# Patient Record
Sex: FEMALE | Race: WHITE | Employment: UNEMPLOYED | URBAN - METROPOLITAN AREA
[De-identification: names, ages, dates, MRNs, and addresses within clinical notes are randomized per-mention and may not be internally consistent; named-entity substitution may affect disease eponyms.]

---

## 2018-08-02 ENCOUNTER — HOSPITAL ENCOUNTER (OUTPATIENT)
Dept: LAB | Age: 33
Discharge: HOME | End: 2018-08-02

## 2018-08-02 LAB — SENTARA SPECIMEN COL,SENBCF: NORMAL

## 2018-08-02 PROCEDURE — 99001 SPECIMEN HANDLING PT-LAB: CPT | Performed by: PSYCHIATRY & NEUROLOGY

## 2020-10-01 NOTE — PERIOP NOTES
PAT - SURGICAL PRE-ADMISSION INSTRUCTIONS    NAME:  Pj Zuluaga                                                          TODAY'S DATE:  10/1/2020    SURGERY DATE:  10/7/2020                                  SURGERY ARRIVAL TIME:   0700 tbv    1. Do NOT eat or drink anything, including candy or gum, after MIDNIGHT on 10/6/20 , unless you have specific instructions from your Surgeon or Anesthesia Provider to do so. 2. No smoking on the day of surgery. 3. No alcohol 24 hours prior to the day of surgery. 4. No recreational drugs for one week prior to the day of surgery. 5. Leave all valuables, including money/purse, at home. 6. Remove all jewelry, nail polish, makeup (including mascara); no lotions, powders, deodorant, or perfume/cologne/after shave. 7. Glasses/Contact lenses and Dentures may be worn to the hospital.  They will be removed prior to surgery. 8. Call your doctor if symptoms of a cold or illness develop within 24 ours prior to surgery. 9. AN ADULT MUST DRIVE YOU HOME AFTER OUTPATIENT SURGERY. 10. If you are having an OUTPATIENT procedure, please make arrangements for a responsible adult to be with you for 24 hours after your surgery. 11. If you are admitted to the hospital, you will be assigned to a bed after surgery is complete. Normally a family member will not be able to see you until you are in your assigned bed. 15. Family is encouraged to accompany you to the hospital.  We ask visitors in the treatment area to be limited to ONE person at a time to ensure patient privacy. EXCEPTIONS WILL BE MADE AS NEEDED. 15. Children under 12 are discouraged from entering the treatment area and need to be supervised by an adult when in the waiting room. Special Instructions:    Bring list of CURRENT medications . , Take these medications the morning of surgery with a sip of water:  morning anxiety / psych meds    Patient Prep:    shower with anti-bacterial soap    These surgical instructions were reviewed with Son Taylor (pt's caregiver) during the PAT phone call. Directions: On the morning of surgery, please go to the main entrance. Sign in at the Registration Desk.     If you have any questions and/or concerns, please do not hesitate to call:  (Prior to the day of surgery)  hospitals unit:  [phone]  (Day of surgery)  [facility] unit:  [phone]

## 2020-10-02 ENCOUNTER — HOSPITAL ENCOUNTER (OUTPATIENT)
Dept: PREADMISSION TESTING | Age: 35
Discharge: HOME | End: 2020-10-02
Payer: MEDICAID

## 2020-10-02 ENCOUNTER — TRANSCRIBE ORDER (OUTPATIENT)
Dept: REGISTRATION | Age: 35
End: 2020-10-02

## 2020-10-02 DIAGNOSIS — Z01.812 BLOOD TESTS PRIOR TO TREATMENT OR PROCEDURE: ICD-10-CM

## 2020-10-02 DIAGNOSIS — Z01.812 BLOOD TESTS PRIOR TO TREATMENT OR PROCEDURE: Primary | ICD-10-CM

## 2020-10-02 DIAGNOSIS — Z20.820 CONTACT WITH OR EXPOSURE TO VARICELLA: ICD-10-CM

## 2020-10-02 PROCEDURE — 87635 SARS-COV-2 COVID-19 AMP PRB: CPT

## 2020-10-03 LAB — SARS-COV-2, COV2NT: NOT DETECTED

## 2020-10-06 ENCOUNTER — ANESTHESIA EVENT (OUTPATIENT)
Dept: SURGERY | Age: 35
End: 2020-10-06
Payer: MEDICAID

## 2020-10-07 ENCOUNTER — HOSPITAL ENCOUNTER (OUTPATIENT)
Age: 35
Setting detail: OUTPATIENT SURGERY
Discharge: HOME | End: 2020-10-07
Attending: DENTIST | Admitting: DENTIST
Payer: MEDICAID

## 2020-10-07 ENCOUNTER — ANESTHESIA (OUTPATIENT)
Dept: SURGERY | Age: 35
End: 2020-10-07
Payer: MEDICAID

## 2020-10-07 VITALS
RESPIRATION RATE: 16 BRPM | WEIGHT: 183 LBS | BODY MASS INDEX: 33.68 KG/M2 | OXYGEN SATURATION: 100 % | HEART RATE: 90 BPM | SYSTOLIC BLOOD PRESSURE: 130 MMHG | HEIGHT: 62 IN | TEMPERATURE: 97.5 F | DIASTOLIC BLOOD PRESSURE: 78 MMHG

## 2020-10-07 DIAGNOSIS — K02.9 CARIES: Primary | ICD-10-CM

## 2020-10-07 LAB — HCG UR QL: NEGATIVE

## 2020-10-07 PROCEDURE — 77030008683 HC TU ET CUF COVD -A: Performed by: ANESTHESIOLOGY

## 2020-10-07 PROCEDURE — 00170 ANES INTRAORAL PX NOS: CPT | Performed by: ANESTHESIOLOGY

## 2020-10-07 PROCEDURE — 74011000258 HC RX REV CODE- 258: Performed by: DENTIST

## 2020-10-07 PROCEDURE — 74011250636 HC RX REV CODE- 250/636: Performed by: DENTIST

## 2020-10-07 PROCEDURE — 74011250637 HC RX REV CODE- 250/637: Performed by: NURSE ANESTHETIST, CERTIFIED REGISTERED

## 2020-10-07 PROCEDURE — 2709999900 HC NON-CHARGEABLE SUPPLY: Performed by: DENTIST

## 2020-10-07 PROCEDURE — 74011250636 HC RX REV CODE- 250/636: Performed by: NURSE ANESTHETIST, CERTIFIED REGISTERED

## 2020-10-07 PROCEDURE — 76210000021 HC REC RM PH II 0.5 TO 1 HR: Performed by: DENTIST

## 2020-10-07 PROCEDURE — 74011000250 HC RX REV CODE- 250: Performed by: DENTIST

## 2020-10-07 PROCEDURE — 77030014006 HC SPNG HEMSTAT J&J -A: Performed by: DENTIST

## 2020-10-07 PROCEDURE — 77030002888 HC SUT CHRMC J&J -A: Performed by: DENTIST

## 2020-10-07 PROCEDURE — 77030032490 HC SLV COMPR SCD KNE COVD -B: Performed by: DENTIST

## 2020-10-07 PROCEDURE — 76210000063 HC OR PH I REC FIRST 0.5 HR: Performed by: DENTIST

## 2020-10-07 PROCEDURE — 76060000032 HC ANESTHESIA 0.5 TO 1 HR: Performed by: DENTIST

## 2020-10-07 PROCEDURE — 00170 ANES INTRAORAL PX NOS: CPT | Performed by: NURSE ANESTHETIST, CERTIFIED REGISTERED

## 2020-10-07 PROCEDURE — 76010000138 HC OR TIME 0.5 TO 1 HR: Performed by: DENTIST

## 2020-10-07 PROCEDURE — 88305 TISSUE EXAM BY PATHOLOGIST: CPT

## 2020-10-07 PROCEDURE — 81025 URINE PREGNANCY TEST: CPT

## 2020-10-07 PROCEDURE — 77030028681 HC DRSG NSL ABSRB NASOPORE STRY -C: Performed by: DENTIST

## 2020-10-07 RX ORDER — SODIUM CHLORIDE, SODIUM LACTATE, POTASSIUM CHLORIDE, CALCIUM CHLORIDE 600; 310; 30; 20 MG/100ML; MG/100ML; MG/100ML; MG/100ML
25 INJECTION, SOLUTION INTRAVENOUS CONTINUOUS
Status: DISCONTINUED | OUTPATIENT
Start: 2020-10-07 | End: 2020-10-07 | Stop reason: HOSPADM

## 2020-10-07 RX ORDER — FENTANYL CITRATE 50 UG/ML
INJECTION, SOLUTION INTRAMUSCULAR; INTRAVENOUS AS NEEDED
Status: DISCONTINUED | OUTPATIENT
Start: 2020-10-07 | End: 2020-10-07 | Stop reason: HOSPADM

## 2020-10-07 RX ORDER — TOPIRAMATE 100 MG/1
TABLET, FILM COATED ORAL 2 TIMES DAILY
COMMUNITY

## 2020-10-07 RX ORDER — AMOXICILLIN 500 MG/1
500 CAPSULE ORAL 3 TIMES DAILY
Qty: 21 CAP | Refills: 0 | Status: SHIPPED | OUTPATIENT
Start: 2020-10-07 | End: 2020-10-14

## 2020-10-07 RX ORDER — FENTANYL CITRATE 50 UG/ML
50 INJECTION, SOLUTION INTRAMUSCULAR; INTRAVENOUS
Status: DISCONTINUED | OUTPATIENT
Start: 2020-10-07 | End: 2020-10-07 | Stop reason: HOSPADM

## 2020-10-07 RX ORDER — CHLORHEXIDINE GLUCONATE 1.2 MG/ML
15 RINSE ORAL EVERY 12 HOURS
Qty: 960 ML | Refills: 0 | Status: SHIPPED | OUTPATIENT
Start: 2020-10-07 | End: 2020-10-17

## 2020-10-07 RX ORDER — SUCCINYLCHOLINE CHLORIDE 100 MG/5ML
SYRINGE (ML) INTRAVENOUS AS NEEDED
Status: DISCONTINUED | OUTPATIENT
Start: 2020-10-07 | End: 2020-10-07 | Stop reason: HOSPADM

## 2020-10-07 RX ORDER — OXYCODONE AND ACETAMINOPHEN 5; 325 MG/1; MG/1
1 TABLET ORAL AS NEEDED
Status: DISCONTINUED | OUTPATIENT
Start: 2020-10-07 | End: 2020-10-07 | Stop reason: HOSPADM

## 2020-10-07 RX ORDER — MEDROXYPROGESTERONE ACETATE 150 MG/ML
150 INJECTION, SUSPENSION INTRAMUSCULAR ONCE
COMMUNITY

## 2020-10-07 RX ORDER — PROPOFOL 10 MG/ML
INJECTION, EMULSION INTRAVENOUS AS NEEDED
Status: DISCONTINUED | OUTPATIENT
Start: 2020-10-07 | End: 2020-10-07 | Stop reason: HOSPADM

## 2020-10-07 RX ORDER — MORPHINE SULFATE 2 MG/ML
2 INJECTION, SOLUTION INTRAMUSCULAR; INTRAVENOUS
Status: DISCONTINUED | OUTPATIENT
Start: 2020-10-07 | End: 2020-10-07 | Stop reason: HOSPADM

## 2020-10-07 RX ORDER — IBUPROFEN 800 MG/1
800 TABLET ORAL
Qty: 20 TAB | Refills: 1 | Status: SHIPPED | OUTPATIENT
Start: 2020-10-07

## 2020-10-07 RX ORDER — HYDROCODONE BITARTRATE AND ACETAMINOPHEN 7.5; 325 MG/1; MG/1
1 TABLET ORAL
Qty: 12 TAB | Refills: 0 | Status: SHIPPED | OUTPATIENT
Start: 2020-10-07 | End: 2020-10-10

## 2020-10-07 RX ORDER — LORAZEPAM 1 MG/1
TABLET ORAL
COMMUNITY

## 2020-10-07 RX ORDER — FAMOTIDINE 20 MG/1
20 TABLET, FILM COATED ORAL ONCE
Status: COMPLETED | OUTPATIENT
Start: 2020-10-07 | End: 2020-10-07

## 2020-10-07 RX ORDER — BENZONATATE 200 MG/1
200 CAPSULE ORAL
COMMUNITY

## 2020-10-07 RX ORDER — ONDANSETRON 2 MG/ML
INJECTION INTRAMUSCULAR; INTRAVENOUS AS NEEDED
Status: DISCONTINUED | OUTPATIENT
Start: 2020-10-07 | End: 2020-10-07 | Stop reason: HOSPADM

## 2020-10-07 RX ORDER — LAMOTRIGINE 100 MG/1
TABLET ORAL DAILY
COMMUNITY

## 2020-10-07 RX ORDER — LIDOCAINE HYDROCHLORIDE AND EPINEPHRINE 20; 5 MG/ML; UG/ML
INJECTION, SOLUTION EPIDURAL; INFILTRATION; INTRACAUDAL; PERINEURAL AS NEEDED
Status: DISCONTINUED | OUTPATIENT
Start: 2020-10-07 | End: 2020-10-07 | Stop reason: HOSPADM

## 2020-10-07 RX ORDER — MIDAZOLAM HYDROCHLORIDE 1 MG/ML
INJECTION, SOLUTION INTRAMUSCULAR; INTRAVENOUS AS NEEDED
Status: DISCONTINUED | OUTPATIENT
Start: 2020-10-07 | End: 2020-10-07 | Stop reason: HOSPADM

## 2020-10-07 RX ORDER — RISPERIDONE 2 MG/1
TABLET, FILM COATED ORAL
COMMUNITY

## 2020-10-07 RX ORDER — CHLORHEXIDINE GLUCONATE 1.2 MG/ML
RINSE ORAL AS NEEDED
Status: DISCONTINUED | OUTPATIENT
Start: 2020-10-07 | End: 2020-10-07 | Stop reason: HOSPADM

## 2020-10-07 RX ORDER — LORATADINE 10 MG/1
10 TABLET ORAL
COMMUNITY

## 2020-10-07 RX ORDER — BISMUTH SUBSALICYLATE 262 MG
1 TABLET,CHEWABLE ORAL DAILY
COMMUNITY

## 2020-10-07 RX ADMIN — SODIUM CHLORIDE, SODIUM LACTATE, POTASSIUM CHLORIDE, AND CALCIUM CHLORIDE 25 ML/HR: 600; 310; 30; 20 INJECTION, SOLUTION INTRAVENOUS at 08:47

## 2020-10-07 RX ADMIN — FENTANYL CITRATE 100 MCG: 50 INJECTION, SOLUTION INTRAMUSCULAR; INTRAVENOUS at 10:11

## 2020-10-07 RX ADMIN — Medication 100 MG: at 10:16

## 2020-10-07 RX ADMIN — FAMOTIDINE 20 MG: 20 TABLET ORAL at 08:37

## 2020-10-07 RX ADMIN — FENTANYL CITRATE 100 MCG: 50 INJECTION, SOLUTION INTRAMUSCULAR; INTRAVENOUS at 10:56

## 2020-10-07 RX ADMIN — PROPOFOL 120 MG: 10 INJECTION, EMULSION INTRAVENOUS at 10:16

## 2020-10-07 RX ADMIN — ONDANSETRON 4 MG: 2 SOLUTION INTRAMUSCULAR; INTRAVENOUS at 10:15

## 2020-10-07 RX ADMIN — MIDAZOLAM 2 MG: 1 INJECTION INTRAMUSCULAR; INTRAVENOUS at 10:05

## 2020-10-07 RX ADMIN — AMPICILLIN SODIUM 2 G: 2 INJECTION, POWDER, FOR SOLUTION INTRAMUSCULAR; INTRAVENOUS at 10:15

## 2020-10-07 NOTE — BRIEF OP NOTE
Brief Postoperative Note    Patient: Doris Muniz  YOB: 1985  MRN: [id number]    Date of Procedure: 10/7/2020     Pre-Op Diagnosis: K1.1, Retained dental roots, Tongue lesion    Post-Op Diagnosis: Same as preoperative diagnosis.       Procedure(s):  Surgical Extraction #32 Surgical Removal Tips 2,12,13,17,32, biopsy of oral tissue soft    Surgeon(s):  Rafa Peraza DDS    Surgical Assistant: Madelin Leon    Anesthesia: General     Estimated Blood Loss (mL): less than 50     Complications: Sinus exposure at the site of palatal root of #2    Specimens:   ID Type Source Tests Collected by Time Destination   1 : tongue lesion Preservative Tongue  Rafa Peraza DDS 10/7/2020 1048 Pathology        Implants:  None    Drains: None    Findings: As expected from pre-operative diagnoses with the exception of the abscess from #2 connecting to the patient's sinus cavity    Electronically Signed by Elise Aguilar DDS on 10/7/2020 at 10:58 AM

## 2020-10-07 NOTE — OP NOTES
[facility]  OPERATIVE REPORT    Name:  Benoit Larkin  MR#:   [id number]  :  1985  ACCOUNT #:  [de-identified]  DATE OF SERVICE:  10/07/2020    PREOPERATIVE DIAGNOSES:  1. Retained dental roots. 2.  Dental caries. 3. Tongue Lesion    POSTOPERATIVE DIAGNOSES:  1. Retained dental roots. 2.  Dental caries. 3. Tongue Lesion    PROCEDURE PERFORMED:  1. Surgical extraction of retained roots #2, 12, 13, 17 and 32.  2.  Excisional biopsy of ventral tongue lesion. SURGEON:  Elise Aguilar DDS    ASSISTANT:  Chilango Sow. ANESTHESIA:  General via nasotracheal intubation. COMPLICATIONS:  None. SPECIMENS REMOVED:  Ventral tongue lesion. Clinical diagnosis: Fibroma    IMPLANTS:  none    ESTIMATED BLOOD LOSS:  Less than 25 mL. INDICATIONS:  This patient is a 50-year-old female who presented to [facility] for referral for extraction of the above-listed teeth. Due to the patient's history of autism and inability to cooperate adequately, the patient was deemed necessary to take to the operating room in order to perform her procedure under general anesthesia. After all risks, benefits, and alternatives were discussed with the patient's mother by phone, an informed consent was done over the phone with a witness. TECHNIQUE:  The patient was correctly identified in the preoperative holding area and taken to operating room #5. She was transferred from the stretcher to the OR table once the patient was adequately sedated. The patient underwent induction and nasotracheal intubation without complication by the Anesthesia team.  The eyes were taped by myself. Endotracheal tube was secured by the Anesthesia team with my assistance. The patient was moved away from Anesthesia and prepped and draped in normal fashion for oral surgery. A time-out was performed with all OR staff present. The oral cavity and oropharynx were suctioned and a throat pack was placed.   The patient had a total of 10 mL of 2% lidocaine 1:100,000 epinephrine injected. Attention was directed to the tooth #32 where a sulcular incision was made from the distal #31 to the distal #32 with a distal buccal release. Full mucoperiosteal flap was elevated with #9 periosteal elevator. Buccal bone and distal bone was removed. The tooth was then elevated and removed with #151 forceps. Attention was directed to tooth #2. A sulcular incision was made from the distal #2 to the distal #3 with a distal buccal vertical release. Full mucoperiosteal flap was elevated. Buccal bone was removed with a #702 bur. Tooth roots were elevated and removed with the rongeur forceps. It was noted after removal of the palatal root, there was an exposed sinus membrane. Sites were curetted and irrigated with normal saline. Gelfoam was packed in the site of #2. Both sites were closed with 3-0 chromic gut suture in interrupted fashion. Attention was directed to the left side of the oral cavity where a sulcular incision was made from distal tooth #18 to the distal tooth #17 with a distal buccal release. Full mucoperiosteal flap was elevated. Distal and buccal bone was removed. Tooth was elevated and removed with a #150 forceps. Attention was directed to teeth #12 and 13. A sulcular incision was made from the distal tooth #14 to the mesial #11. A full mucoperiosteal flap was elevated with a #9 periosteal elevator. Buccal bone was removed from both teeth. The teeth were then elevated and removed with the Edwin forceps. Extraction sites were curetted. The bone was smoothed with a bone file on both sites. Sites were irrigated with copious amounts of normal saline and the site of #17 was closed in interrupted fashion. The sites of 12 and 13 were closed in continuous locking fashion. Attention was directed to the tongue lesion. The lesion was removed with #15 blade and Adson's pickups.   The site was closed with 4-0 chromic gut suture. Oral cavity was suctioned dry. The throat pack was suctioned and removed. The patient underwent emergence and extubation without complication. Two 4x4 non-count gauze were placed on each side of the oral cavity tied with umbilical tape which was hanging out of the mouth.       Nuzhat Almaguer DDS      [id number]  D:  10/07/2020 11:02  T:  10/07/2020 19:03  JOB #:  [id number]

## 2020-10-07 NOTE — ANESTHESIA PREPROCEDURE EVALUATION
Relevant Problems   No relevant active problems       Anesthetic History   No history of anesthetic complications            Review of Systems / Medical History  Patient summary reviewed and pertinent labs reviewed    Pulmonary  Within defined limits                 Neuro/Psych     seizures: well controlled         Cardiovascular  Within defined limits                Exercise tolerance: >4 METS     GI/Hepatic/Renal  Within defined limits              Endo/Other  Within defined limits           Other Findings              Physical Exam    Airway  Mallampati: II  TM Distance: 4 - 6 cm  Neck ROM: normal range of motion   Mouth opening: Normal     Cardiovascular  Regular rate and rhythm,  S1 and S2 normal,  no murmur, click, rub, or gallop  Rhythm: regular  Rate: normal         Dental    Dentition: Poor dentition     Pulmonary  Breath sounds clear to auscultation               Abdominal  GI exam deferred       Other Findings            Anesthetic Plan    ASA: 2  Anesthesia type: MAC          Induction: Intravenous  Anesthetic plan and risks discussed with: Patient

## 2020-10-07 NOTE — H&P
Date of Surgery Update:  Eladio Mckee was seen and examined. History and physical has been reviewed. The patient has been examined.  There have been no significant clinical changes since the completion of the originally dated History and Physical.    Signed By: Noelle Barkley DDS     October 7, 2020 9:34 AM

## 2020-10-07 NOTE — PERIOP NOTES
Pre-Op Summary    Pt arrived via car with family/friend and is oriented to time, place, person and situation. Patient with steady gait with none assistive devices. Visit Vitals  /78 (BP 1 Location: Left arm, BP Patient Position: At rest)   Pulse 87   Temp 97.7 °F (36.5 °C)   Resp 16   Ht 5' 2\" (1.575 m)   Wt 83 kg (183 lb)   SpO2 95%   BMI 33.47 kg/m²       Peripheral IV located on Left hand . Patients belongings are located locked in store room. Patient's point of contact is Dana, direct support staff and their contact number is: [phone]. They will be in waiting room They are able to receive medication information. They will be their ride home. Repeated reassurance given. Caregiver at bedside.

## 2020-10-07 NOTE — ANESTHESIA POSTPROCEDURE EVALUATION
Procedure(s):  Surgical Extraction #32 Surgical Removal Tips 7,39,06,95,16, biopsy of oral tissue soft.     MAC    Anesthesia Post Evaluation      Multimodal analgesia: multimodal analgesia used between 6 hours prior to anesthesia start to PACU discharge  Patient location during evaluation: bedside  Patient participation: complete - patient participated  Level of consciousness: awake  Pain score: 0  Pain management: adequate  Airway patency: patent  Anesthetic complications: no  Cardiovascular status: stable  Respiratory status: acceptable  Hydration status: acceptable  Post anesthesia nausea and vomiting:  none  Final Post Anesthesia Temperature Assessment:  Normothermia (36.0-37.5 degrees C)      INITIAL Post-op Vital signs:   Vitals Value Taken Time   /73 10/7/2020 11:16 AM   Temp 36.4 °C (97.5 °F) 10/7/2020 11:06 AM   Pulse 86 10/7/2020 11:18 AM   Resp 16 10/7/2020 11:18 AM   SpO2 100 % 10/7/2020 11:18 AM

## 2020-10-07 NOTE — DISCHARGE INSTRUCTIONS
DISCHARGE SUMMARY from Nurse    PATIENT INSTRUCTIONS:    After general anesthesia or intravenous sedation, for 24 hours or while taking prescription Narcotics:  · Limit your activities  · Do not drive and operate hazardous machinery  · Do not make important personal or business decisions  · Do  not drink alcoholic beverages  · If you have not urinated within 8 hours after discharge, please contact your surgeon on call. Report the following to your surgeon:  · Excessive pain, swelling, redness or odor of or around the surgical area  · Temperature over 100.5  · Nausea and vomiting lasting longer than 4 hours or if unable to take medications  · Any signs of decreased circulation or nerve impairment to extremity: change in color, persistent  numbness, tingling, coldness or increase pain  · Any questions    What to do at Home:  Recommended activity: Activity as tolerated       Please carry medication information at all times in case of emergency situations. These are general instructions for a healthy lifestyle:    No smoking/ No tobacco products/ Avoid exposure to second hand smoke  Surgeon General's Warning:  Quitting smoking now greatly reduces serious risk to your health. Obesity, smoking, and sedentary lifestyle greatly increases your risk for illness    A healthy diet, regular physical exercise & weight monitoring are important for maintaining a healthy lifestyle    You may be retaining fluid if you have a history of heart failure or if you experience any of the following symptoms:  Weight gain of 3 pounds or more overnight or 5 pounds in a week, increased swelling in our hands or feet or shortness of breath while lying flat in bed. Please call your doctor as soon as you notice any of these symptoms; do not wait until your next office visit. The discharge information has been reviewed with the patient and caregiver. The caregiver verbalized understanding.   Discharge medications reviewed with the caregiver and appropriate educational materials and side effects teaching were provided. Patient Education        Learning About Coronavirus [phone])  Coronavirus [phone]): Overview  What is coronavirus ([id number]-16)? The coronavirus disease (COVID-19) is caused by a virus. It is an illness that was first found in December 2019. It has since spread worldwide. The virus can cause fever, cough, and trouble breathing. In severe cases, it can cause pneumonia and make it hard to breathe without help. It can cause death. This virus spreads person-to-person through droplets from coughing and sneezing. It can also spread when you are close to someone who is infected. And it can spread when you touch something that has the virus on it, such as a doorknob or a tabletop. Coronaviruses are a large group of viruses. They cause the common cold. They also cause more serious illnesses like Middle East respiratory syndrome (MERS) and severe acute respiratory syndrome (SARS). COVID-19 is caused by a novel coronavirus. That means it's a new type that has not been seen in people before. How is COVID-19 treated? Mild illness can be treated at home, but more serious illness needs to be treated in the hospital. Treatment may include medicines to reduce symptoms, plus breathing support such as oxygen therapy or a ventilator. Other treatments, such as antiviral medicines, may help people who have COVID-19. What can you do to protect yourself from COVID-19? The best way to protect yourself from getting sick is to:  · Avoid areas where there is an outbreak. · Avoid contact with people who may be infected. · Avoid crowds and try to stay at least 6 feet away from other people. · Wash your hands often, especially after you cough or sneeze. Use soap and water, and scrub for at least 20 seconds. If soap and water aren't available, use an alcohol-based hand . · Avoid touching your mouth, nose, and eyes.   What can you do to avoid spreading the virus to others? To help avoid spreading the virus to others:  · Wash your hands often with soap or alcohol-based hand sanitizers. · Cover your mouth with a tissue when you cough or sneeze. Then throw the tissue in the trash. · Use a disinfectant to clean things that you touch often. These include doorknobs, remote controls, phones, and handles on your refrigerator and microwave. And don't forget countertops, tabletops, bathrooms, and computer keyboards. · Wear a cloth face cover if you have to go to public areas. If you know or suspect that you have COVID-19:  · Stay home. Don't go to school, work, or public areas. And don't use public transportation, ride-shares, or taxis unless you have no choice. · Leave your home only if you need to get medical care or testing. But call the doctor's office first so they know you're coming. And wear a face cover. · Limit contact with people in your home. If possible, stay in a separate bedroom and use a separate bathroom. · Wear a face cover whenever you're around other people. It can help stop the spread of the virus when you cough or sneeze. · Clean and disinfect your home every day. Use household  and disinfectant wipes or sprays. Take special care to clean things that you grab with your hands. · Self-isolate until it's safe to be around others again. ? If you have symptoms, it's safe when you haven't had a fever for 3 days and your symptoms have improved and it's been at least 10 days since your symptoms started. ? If you were exposed to the virus but don't have symptoms, it's safe to be around others 14 days after exposure. ? Talk to your doctor about whether you also need testing, especially if you have a weakened immune system. When to call for help  Call 911 anytime you think you may need emergency care. For example, call if:  · You have severe trouble breathing.  (You can't talk at all.)  · You have constant chest pain or pressure. · You are severely dizzy or lightheaded. · You are confused or can't think clearly. · Your face and lips have a blue color. · You passed out (lost consciousness) or are very hard to wake up. Call your doctor now if you develop symptoms such as:  · Shortness of breath. · Fever. · Cough. If you need to get care, call ahead to the doctor's office for instructions before you go. Make sure you wear a face cover to prevent exposing other people to the virus. Where can you get the latest information? The following health organizations are tracking and studying this virus. Their websites contain the most up-to-date information. [place] May also learn what to do if you think you may have been exposed to the virus. · U.S. Centers for Disease Control and Prevention (CDC): The CDC provides updated news about the disease and travel advice. The website also tells you how to prevent the spread of infection. www.cdc.gov  · World Health Organization [facility]): WHO offers information about the virus outbreaks. WHO also has travel advice. www.who.int  Current as of: July 10, 2020               Content Version: 12.6  © 1654-4434 [employer]. Care instructions adapted under license by [employer] (which disclaims liability or warranty for this information). If you have questions about a medical condition or this instruction, always ask your healthcare professional. [address] any warranty or liability for your use of this information.        .Patient armband removed and shredded

## 2020-10-07 NOTE — PERIOP NOTES
Phase 2 Recovery Summary    Report received from ANNA Gregg    Vitals:    10/07/20 1112 10/07/20 1116 10/07/20 1118 10/07/20 1121   BP: 132/68 132/73  130/78   Pulse: 92 86 86 90   Resp: 19 14 16 16   Temp:    97.5 °F (36.4 °C)   SpO2: 100% 100% 100% 100%   Weight:       Height:           oriented to time, place, person and situation  Called Dr. Angie Tanner to clarify orders for sinus/extractions. Discharge instructions for sinus on back of yellow discharge sheet. Reviewed with caregiver. Lines and Drains  Peripheral Intravenous Line:  Removed prior to discharge. Wound  Wound Mouth (Active)   Dressing Status Breakthrough drainage 10/07/20 1106   Dressing Type 4 x 4 10/07/20 1106   Incision Site Well Approximated Yes 10/07/20 0900   Number of days: 0        Patient assisted to chair with minimal assist. Pateint tolerating liquids well. Patient's caregiver at bedside. Discharge discussed with caregiver family. Instructions also faxed to [facility]. No questions or concerns at this time. Discharge medications reviewed with guardian and caregiver and appropriate educational materials and side effects teaching were provided. Patient discharged to home with guardian.        Allie Heaton RN

## 2022-04-27 ENCOUNTER — HOSPITAL ENCOUNTER (EMERGENCY)
Age: 37
Discharge: HOME | End: 2022-04-28
Attending: EMERGENCY MEDICINE | Admitting: EMERGENCY MEDICINE
Payer: MEDICAID

## 2022-04-27 DIAGNOSIS — R56.9 SEIZURE (HCC): Primary | ICD-10-CM

## 2022-04-27 PROCEDURE — 99283 EMERGENCY DEPT VISIT LOW MDM: CPT

## 2022-04-28 VITALS
HEART RATE: 97 BPM | RESPIRATION RATE: 22 BRPM | OXYGEN SATURATION: 98 % | DIASTOLIC BLOOD PRESSURE: 81 MMHG | TEMPERATURE: 97.8 F | SYSTOLIC BLOOD PRESSURE: 135 MMHG

## 2022-04-28 RX ORDER — LORAZEPAM 1 MG/1
1 TABLET ORAL
Status: DISCONTINUED | OUTPATIENT
Start: 2022-04-28 | End: 2022-04-28 | Stop reason: HOSPADM

## 2022-04-28 NOTE — ED PROVIDER NOTES
EMERGENCY DEPARTMENT HISTORY AND PHYSICAL EXAM    12:22 AM patient seen at this time in room 9      Date: 4/27/2022  Patient Name: Gracie Chowdhury    History of Presenting Illness     Chief Complaint   Patient presents with    Seizure         History Provided By: patient/caregiver    Additional History (Context): Gracie Chowdhury is a 39 y.o. female presents with mental retardation seizure disorder resident of a group home, staff alerted the director is here that patient screamed loudly and had a shaking spell with confusion afterwards suspicious for generalized seizure. Taking all medications as directed no extra doses of Ativan no medications missed. Her caregiver states she is acting at her baseline here. Patient herself denies any acute discomfort. Very talkative, animated and interactive. Elisabet Mcneill PCP: None    Chief Complaint:   Duration:    Timing:    Location:   Quality:   Severity:   Modifying Factors:   Associated Symptoms:       Current Outpatient Medications   Medication Sig Dispense Refill    loratadine (CLARITIN) 10 mg tablet Take 10 mg by mouth.  multivitamin (ONE A DAY) tablet Take 1 Tab by mouth daily.  lamoTRIgine (LaMICtal) 100 mg tablet Take  by mouth daily. Take 2 1/2 tabs bid      risperiDONE (RisperDAL) 2 mg tablet Take  by mouth.  topiramate (TOPAMAX) 100 mg tablet Take  by mouth two (2) times a day.  LORazepam (ATIVAN) 1 mg tablet Take  by mouth every eight (8) hours as needed for Anxiety.  benzonatate (TESSALON) 200 mg capsule Take 200 mg by mouth three (3) times daily as needed for Cough.  medroxyPROGESTERone (DEPO-PROVERA) 150 mg/mL syrg 150 mg by IntraMUSCular route once. Every 3 mos.  ibuprofen (MOTRIN) 800 mg tablet Take 1 Tab by mouth every six (6) hours as needed for Pain. 20 Tab 1    benztropine (COGENTIN) 1 mg tablet Take 1 mg by mouth two (2) times a day.  clonazePAM (KLONOPIN) 1 mg tablet Take  by mouth two (2) times a day.          Past History Past Medical History:  Past Medical History:   Diagnosis Date    Infectious disease     Mental retardation     Seizures ([address]     none in years       Past Surgical History:  No past surgical history on file. Family History:  Family History   Family history unknown: Yes       Social History:  Social History     Tobacco Use    Smoking status: Never Smoker    Smokeless tobacco: Never Used   Substance Use Topics    Alcohol use: No    Drug use: No       Allergies:  No Known Allergies      Review of Systems     Review of Systems   Constitutional: Negative for diaphoresis and fever. HENT: Negative for congestion and sore throat. Eyes: Negative for pain and itching. Respiratory: Negative for cough and shortness of breath. Cardiovascular: Negative for chest pain and palpitations. Gastrointestinal: Negative for abdominal pain and diarrhea. Endocrine: Negative for polydipsia and polyuria. Genitourinary: Negative for dysuria and hematuria. Musculoskeletal: Negative for arthralgias and myalgias. Skin: Negative for rash and wound. Neurological: Positive for seizures. Negative for syncope. Hematological: Does not bruise/bleed easily. Psychiatric/Behavioral: Negative for agitation and hallucinations. Physical Exam       Patient Vitals for the past 12 hrs:   Temp Pulse Resp BP SpO2   04/28/22 0008 97.8 °F (36.6 °C) (!) 107 24 135/70 96 %       IPVITALS  Patient Vitals for the past 24 hrs:   BP Temp Pulse Resp SpO2   04/28/22 0008 135/70 97.8 °F (36.6 °C) (!) 107 24 96 %       Physical Exam  Vitals and nursing note reviewed. Constitutional:       Appearance: She is well-developed. HENT:      Head: Normocephalic and atraumatic. Eyes:      General: No scleral icterus. Conjunctiva/sclera: Conjunctivae normal.   Neck:      Vascular: No JVD. Cardiovascular:      Rate and Rhythm: Normal rate and regular rhythm. Heart sounds: Normal heart sounds.       Comments: 4 intact extremity pulses  Pulmonary:      Effort: Pulmonary effort is normal.      Breath sounds: Normal breath sounds. Abdominal:      Palpations: Abdomen is soft. There is no mass. Tenderness: There is no abdominal tenderness. Musculoskeletal:         General: Normal range of motion. Cervical back: Normal range of motion and neck supple. Lymphadenopathy:      Cervical: No cervical adenopathy. Skin:     General: Skin is warm and dry. Neurological:      General: No focal deficit present. Mental Status: She is alert. Cranial Nerves: No cranial nerve deficit. Comments: Speech is at baseline for the patient. Appropriate sentence formation appropriate rate and word selection           Diagnostic Study Results   Labs -  No results found for this or any previous visit (from the past 24 hour(s)). Radiologic Studies -   No orders to display     No results found. Medications ordered:   Medications - No data to display      Medical Decision Making   Initial Medical Decision Making and DDx:  Informed discussion with her caregiver, patient is clearly at her normal baseline no focal neurologic deficit I do not think there is an alarming intracranial lesion to be found no indication to raise suspicion for a metabolic abnormality causing the seizure. She is diagnosed with epilepsy and compliant with medications and has had a single seizure and is now back to baseline. Quite fearful of having other procedures done. I do not see a hard indication to force blood work at this time. Informed discussion with her caregiver who agrees with plan for discharge, monitoring, return to the emergency department for repeated seizures. ED Course: Progress Notes, Reevaluation, and Consults:         I am the first provider for this patient. I reviewed the vital signs, available nursing notes, past medical history, past surgical history, family history and social history.     Patient Vitals for the past 12 hrs:   Temp Pulse Resp BP SpO2   04/28/22 0008 97.8 °F (36.6 °C) (!) 107 24 135/70 96 %       Vital Signs-Reviewed the patient's vital signs. Pulse Oximetry Analysis, Cardiac Monitor, 12 lead ekg:      Interpreted by the EP. Records Reviewed: Nursing notes reviewed (Time of Review: 12:22 AM)    Procedures:   Critical Care Time:   Aspirin: (was aspirin given for stroke?)    Diagnosis     Clinical Impression:   1. Seizure [facility])        Disposition: Discharged      Follow-up Information     Follow up With Specialties Details Why [address]  In 2 days  [address]  [address]  [phone]           Patient's Medications   Start Taking    No medications on file   Continue Taking    BENZONATATE (TESSALON) 200 MG CAPSULE    Take 200 mg by mouth three (3) times daily as needed for Cough. BENZTROPINE (COGENTIN) 1 MG TABLET    Take 1 mg by mouth two (2) times a day. CLONAZEPAM (KLONOPIN) 1 MG TABLET    Take  by mouth two (2) times a day. IBUPROFEN (MOTRIN) 800 MG TABLET    Take 1 Tab by mouth every six (6) hours as needed for Pain. LAMOTRIGINE (LAMICTAL) 100 MG TABLET    Take  by mouth daily. Take 2 1/2 tabs bid    LORATADINE (CLARITIN) 10 MG TABLET    Take 10 mg by mouth. LORAZEPAM (ATIVAN) 1 MG TABLET    Take  by mouth every eight (8) hours as needed for Anxiety. MEDROXYPROGESTERONE (DEPO-PROVERA) 150 MG/ML SYRG    150 mg by IntraMUSCular route once. Every 3 mos. MULTIVITAMIN (ONE A DAY) TABLET    Take 1 Tab by mouth daily. RISPERIDONE (RISPERDAL) 2 MG TABLET    Take  by mouth. TOPIRAMATE (TOPAMAX) 100 MG TABLET    Take  by mouth two (2) times a day.    These Medications have changed    No medications on file   Stop Taking    No medications on file     _______________________________    Notes:    Zuleika Valderrama MD using Dragon dictation      _______________________________

## 2022-04-28 NOTE — ED NOTES
MD Hendrickson at bedside evaluating patient and speaking to Ms. Burgess. Patient for discharge home with instructions for monitoring her. Dose of PO Ativan to be given prior to going home. Patient to be discharged home with Ms [facility].

## 2022-04-28 NOTE — ED TRIAGE NOTES
Patient to ED via EMS with complaint of having a seizure. Staff member Macarena Mock called 911 after finding patient screaming EMS states she was having a tonic clonic seizure. Patient resides at [facility]. [address] [facility] at bedside. Patient agitated  Fearful,  of anyone who comes near. Poor historian and is afraid of having surgery(none indicated). She only allows pulse oximeter and blood pressure cuff to be placed.

## 2022-08-18 ENCOUNTER — HOSPITAL ENCOUNTER (EMERGENCY)
Age: 37
Discharge: HOME | End: 2022-08-18
Attending: STUDENT IN AN ORGANIZED HEALTH CARE EDUCATION/TRAINING PROGRAM
Payer: MEDICAID

## 2022-08-18 VITALS
DIASTOLIC BLOOD PRESSURE: 90 MMHG | SYSTOLIC BLOOD PRESSURE: 114 MMHG | HEART RATE: 100 BPM | RESPIRATION RATE: 20 BRPM | OXYGEN SATURATION: 98 % | TEMPERATURE: 98.2 F

## 2022-08-18 DIAGNOSIS — G40.919 BREAKTHROUGH SEIZURE (HCC): Primary | ICD-10-CM

## 2022-08-18 LAB
ANION GAP SERPL CALC-SCNC: 8 MMOL/L (ref 3–18)
APPEARANCE UR: CLEAR
BASOPHILS # BLD: 0 K/UL (ref 0–0.1)
BASOPHILS NFR BLD: 0 % (ref 0–2)
BILIRUB UR QL: NEGATIVE
BUN SERPL-MCNC: 12 MG/DL (ref 7–18)
BUN/CREAT SERPL: 16 (ref 12–20)
CALCIUM SERPL-MCNC: 9.4 MG/DL (ref 8.5–10.1)
CHLORIDE SERPL-SCNC: 110 MMOL/L (ref 100–111)
CO2 SERPL-SCNC: 22 MMOL/L (ref 21–32)
COLOR UR: YELLOW
CREAT SERPL-MCNC: 0.75 MG/DL (ref 0.6–1.3)
DIFFERENTIAL METHOD BLD: ABNORMAL
EOSINOPHIL # BLD: 0 K/UL (ref 0–0.4)
EOSINOPHIL NFR BLD: 0 % (ref 0–5)
ERYTHROCYTE [DISTWIDTH] IN BLOOD BY AUTOMATED COUNT: 14 % (ref 11.6–14.5)
GLUCOSE BLD STRIP.AUTO-MCNC: 129 MG/DL (ref 70–110)
GLUCOSE SERPL-MCNC: 123 MG/DL (ref 74–99)
GLUCOSE UR STRIP.AUTO-MCNC: NEGATIVE MG/DL
HCG SERPL QL: NEGATIVE
HCT VFR BLD AUTO: 45.7 % (ref 35–45)
HGB BLD-MCNC: 15.1 G/DL (ref 12–16)
HGB UR QL STRIP: NEGATIVE
IMM GRANULOCYTES # BLD AUTO: 0.1 K/UL (ref 0–0.04)
IMM GRANULOCYTES NFR BLD AUTO: 1 % (ref 0–0.5)
KETONES UR QL STRIP.AUTO: ABNORMAL MG/DL
LEUKOCYTE ESTERASE UR QL STRIP.AUTO: NEGATIVE
LYMPHOCYTES # BLD: 0.9 K/UL (ref 0.9–3.6)
LYMPHOCYTES NFR BLD: 9 % (ref 21–52)
MCH RBC QN AUTO: 29 PG (ref 24–34)
MCHC RBC AUTO-ENTMCNC: 33 G/DL (ref 31–37)
MCV RBC AUTO: 87.7 FL (ref 78–100)
MONOCYTES # BLD: 0.4 K/UL (ref 0.05–1.2)
MONOCYTES NFR BLD: 4 % (ref 3–10)
NEUTS SEG # BLD: 7.9 K/UL (ref 1.8–8)
NEUTS SEG NFR BLD: 85 % (ref 40–73)
NITRITE UR QL STRIP.AUTO: NEGATIVE
NRBC # BLD: 0 K/UL (ref 0–0.01)
NRBC BLD-RTO: 0 PER 100 WBC
PH UR STRIP: 7.5 [PH] (ref 5–8)
PLATELET # BLD AUTO: 206 K/UL (ref 135–420)
PMV BLD AUTO: 10.5 FL (ref 9.2–11.8)
POTASSIUM SERPL-SCNC: 4 MMOL/L (ref 3.5–5.5)
PROT UR STRIP-MCNC: NEGATIVE MG/DL
RBC # BLD AUTO: 5.21 M/UL (ref 4.2–5.3)
SODIUM SERPL-SCNC: 140 MMOL/L (ref 136–145)
SP GR UR REFRACTOMETRY: 1.01 (ref 1–1.03)
UROBILINOGEN UR QL STRIP.AUTO: 1 EU/DL (ref 0.2–1)
WBC # BLD AUTO: 9.4 K/UL (ref 4.6–13.2)

## 2022-08-18 PROCEDURE — 96365 THER/PROPH/DIAG IV INF INIT: CPT

## 2022-08-18 PROCEDURE — 80048 BASIC METABOLIC PNL TOTAL CA: CPT

## 2022-08-18 PROCEDURE — 99284 EMERGENCY DEPT VISIT MOD MDM: CPT

## 2022-08-18 PROCEDURE — 85025 COMPLETE CBC W/AUTO DIFF WBC: CPT

## 2022-08-18 PROCEDURE — 84703 CHORIONIC GONADOTROPIN ASSAY: CPT

## 2022-08-18 PROCEDURE — 81003 URINALYSIS AUTO W/O SCOPE: CPT

## 2022-08-18 PROCEDURE — 94762 N-INVAS EAR/PLS OXIMTRY CONT: CPT

## 2022-08-18 PROCEDURE — 74011250636 HC RX REV CODE- 250/636: Performed by: STUDENT IN AN ORGANIZED HEALTH CARE EDUCATION/TRAINING PROGRAM

## 2022-08-18 PROCEDURE — 96366 THER/PROPH/DIAG IV INF ADDON: CPT

## 2022-08-18 PROCEDURE — 96361 HYDRATE IV INFUSION ADD-ON: CPT

## 2022-08-18 PROCEDURE — 96375 TX/PRO/DX INJ NEW DRUG ADDON: CPT

## 2022-08-18 PROCEDURE — 82962 GLUCOSE BLOOD TEST: CPT

## 2022-08-18 RX ORDER — LEVETIRACETAM 10 MG/ML
1000 INJECTION INTRAVASCULAR EVERY 12 HOURS
Status: DISCONTINUED | OUTPATIENT
Start: 2022-08-18 | End: 2022-08-18

## 2022-08-18 RX ORDER — LEVETIRACETAM 10 MG/ML
1000 INJECTION INTRAVASCULAR ONCE
Status: COMPLETED | OUTPATIENT
Start: 2022-08-18 | End: 2022-08-18

## 2022-08-18 RX ORDER — ONDANSETRON 2 MG/ML
4 INJECTION INTRAMUSCULAR; INTRAVENOUS
Status: COMPLETED | OUTPATIENT
Start: 2022-08-18 | End: 2022-08-18

## 2022-08-18 RX ORDER — LORAZEPAM 2 MG/ML
1 INJECTION, SOLUTION INTRAMUSCULAR; INTRAVENOUS SEE ADMIN INSTRUCTIONS
Status: DISCONTINUED | OUTPATIENT
Start: 2022-08-18 | End: 2022-08-18 | Stop reason: HOSPADM

## 2022-08-18 RX ADMIN — SODIUM CHLORIDE 1000 ML: 9 INJECTION, SOLUTION INTRAVENOUS at 10:12

## 2022-08-18 RX ADMIN — ONDANSETRON 4 MG: 2 INJECTION INTRAMUSCULAR; INTRAVENOUS at 10:09

## 2022-08-18 RX ADMIN — LORAZEPAM 1 MG: 2 INJECTION, SOLUTION INTRAMUSCULAR; INTRAVENOUS at 12:37

## 2022-08-18 RX ADMIN — LEVETIRACETAM 1000 MG: 1000 INJECTION, SOLUTION INTRAVENOUS at 12:36

## 2022-08-18 NOTE — ED TRIAGE NOTES
38 yo F to ED BIBA from [facility] home for seizure this AM x2. Lasted about 3 mins. EMS reports hx of seizures. Pt aox4. Has developmental delay. Bgl in triage 120's. Pt did vomit in triage.

## 2022-08-18 NOTE — ED PROVIDER NOTES
EMERGENCY DEPARTMENT HISTORY AND PHYSICAL EXAM    I have evaluated the patient at 9:48 AM      Date: 8/18/2022  Patient Name: Simi Davidson    History of Presenting Illness     Chief Complaint   Patient presents with    Seizure         History Provided By: Patient and Caregiver  Location/Duration/Severity/Modifying factors   Patient is a 76-year-old female with history of intellectual disability presenting from group home for seizure this morning. Noticeably group home staff. Lasted approximately 3 minutes and spontaneously resolved. Followed by postictal state with confusion and slow response. On arrival, patient is alert and oriented. Reports having a history of seizure disorder. States that she received her medications related to this morning which is unusual.  She was feeling a bit nauseated earlier but now has no complaints. No headache, vision changes, chest pain, shortness of breath       PCP: None    Current Outpatient Medications   Medication Sig Dispense Refill    loratadine (CLARITIN) 10 mg tablet Take 10 mg by mouth.      multivitamin (ONE A DAY) tablet Take 1 Tab by mouth daily. lamoTRIgine (LaMICtal) 100 mg tablet Take  by mouth daily. Take 2 1/2 tabs bid      risperiDONE (RisperDAL) 2 mg tablet Take  by mouth. topiramate (TOPAMAX) 100 mg tablet Take  by mouth two (2) times a day. LORazepam (ATIVAN) 1 mg tablet Take  by mouth every eight (8) hours as needed for Anxiety. benzonatate (TESSALON) 200 mg capsule Take 200 mg by mouth three (3) times daily as needed for Cough. medroxyPROGESTERone (DEPO-PROVERA) 150 mg/mL syrg 150 mg by IntraMUSCular route once. Every 3 mos. ibuprofen (MOTRIN) 800 mg tablet Take 1 Tab by mouth every six (6) hours as needed for Pain. 20 Tab 1    benztropine (COGENTIN) 1 mg tablet Take 1 mg by mouth two (2) times a day. clonazePAM (KLONOPIN) 1 mg tablet Take  by mouth two (2) times a day.          Past History     Past Medical History:  Past Medical History:   Diagnosis Date    Infectious disease     Mental retardation     Seizures ([address]     none in years       Past Surgical History:  No past surgical history on file. Family History:  Family History   Family history unknown: Yes       Social History:  Social History     Tobacco Use    Smoking status: Never    Smokeless tobacco: Never   Substance Use Topics    Alcohol use: No    Drug use: No       Allergies:  No Known Allergies      Review of Systems       Review of Systems   Constitutional:  Negative for activity change, chills, diaphoresis, fatigue and fever. Eyes:  Negative for photophobia, pain and visual disturbance. Respiratory:  Negative for cough, chest tightness, shortness of breath, wheezing and stridor. Cardiovascular:  Negative for chest pain and palpitations. Gastrointestinal:  Positive for nausea. Negative for abdominal distention, abdominal pain, constipation, diarrhea and vomiting. Genitourinary:  Negative for difficulty urinating, dysuria and hematuria. Musculoskeletal:  Negative for back pain, joint swelling and myalgias. Skin:  Negative for rash and wound. Neurological:  Positive for seizures. Negative for dizziness, tremors, syncope, facial asymmetry, speech difficulty, weakness, light-headedness, numbness and headaches. Psychiatric/Behavioral:  Negative for agitation. The patient is not nervous/anxious. Physical Exam   There were no vitals taken for this visit. Physical Exam  Constitutional:       General: She is not in acute distress. Appearance: She is not toxic-appearing. HENT:      Head: Normocephalic and atraumatic. Mouth/Throat:      Mouth: Mucous membranes are moist.   Eyes:      Extraocular Movements: Extraocular movements intact. Pupils: Pupils are equal, round, and reactive to light. Cardiovascular:      Rate and Rhythm: Normal rate and regular rhythm. Heart sounds: Normal heart sounds.  No murmur heard.    No friction rub. No gallop. Pulmonary:      Effort: Pulmonary effort is normal.      Breath sounds: Normal breath sounds. Abdominal:      General: There is no distension. Palpations: Abdomen is soft. There is no mass. Tenderness: There is no abdominal tenderness. There is no guarding. Hernia: No hernia is present. Musculoskeletal:         General: No swelling, tenderness or deformity. Cervical back: Normal range of motion and neck supple. Skin:     General: Skin is warm and dry. Findings: No rash. Neurological:      General: No focal deficit present. Mental Status: She is alert and oriented to person, place, and time. Cranial Nerves: No cranial nerve deficit. Sensory: No sensory deficit. Motor: No weakness. Coordination: Coordination normal.   Psychiatric:         Mood and Affect: Mood normal.         Diagnostic Study Results     Labs -  No results found for this or any previous visit (from the past 12 hour(s)). Radiologic Studies -   No orders to display         Medical Decision Making   I am the first provider for this patient. I reviewed the vital signs, available nursing notes, past medical history, past surgical history, family history and social history. Vital Signs-Reviewed the patient's vital signs. EKG:     Records Reviewed:  (Time of Review: 9:48 AM)    ED Course: Progress Notes, Reevaluation, and Consults:         Provider Notes (Medical Decision Making):   MDM  Number of Diagnoses or Management Options  Breakthrough seizure [facility])  Diagnosis management comments: 76-year-old female presenting to the emergency department for evaluation of seizure this morning. Is currently alert and awake and nonfocal on exam.  Vital signs stable. No signs of trauma. Will obtain screening lab work and urinalysis. Give IV fluids. Will monitor.       Patient monitored and had 1 episode of generalized tonic-clonic seizure that spontaneously resolved less than 1 minute. Patient was monitored shortly with postictal state. Patient was given Keppra and Ativan and IV fluids. She returned to baseline and was monitored for another 2 hours. Screening lab works unremarkable. Patient likely with breakthrough seizure. Will be discharged back to group home with instructions to adhere to patient's antiepileptic regimen. Procedures    Critical Care Time:       Diagnosis     Clinical Impression: No diagnosis found. Disposition: discharged    Follow-up Information    None          Patient's Medications   Start Taking    No medications on file   Continue Taking    BENZONATATE (TESSALON) 200 MG CAPSULE    Take 200 mg by mouth three (3) times daily as needed for Cough. BENZTROPINE (COGENTIN) 1 MG TABLET    Take 1 mg by mouth two (2) times a day. CLONAZEPAM (KLONOPIN) 1 MG TABLET    Take  by mouth two (2) times a day. IBUPROFEN (MOTRIN) 800 MG TABLET    Take 1 Tab by mouth every six (6) hours as needed for Pain. LAMOTRIGINE (LAMICTAL) 100 MG TABLET    Take  by mouth daily. Take 2 1/2 tabs bid    LORATADINE (CLARITIN) 10 MG TABLET    Take 10 mg by mouth. LORAZEPAM (ATIVAN) 1 MG TABLET    Take  by mouth every eight (8) hours as needed for Anxiety. MEDROXYPROGESTERONE (DEPO-PROVERA) 150 MG/ML SYRG    150 mg by IntraMUSCular route once. Every 3 mos. MULTIVITAMIN (ONE A DAY) TABLET    Take 1 Tab by mouth daily. RISPERIDONE (RISPERDAL) 2 MG TABLET    Take  by mouth. TOPIRAMATE (TOPAMAX) 100 MG TABLET    Take  by mouth two (2) times a day. These Medications have changed    No medications on file   Stop Taking    No medications on file     Disclaimer: Sections of this note are dictated using utilizing voice recognition software. Minor typographical errors may be present. If questions arise, please do not hesitate to contact me or call our department.

## 2023-06-02 ENCOUNTER — HOSPITAL ENCOUNTER (EMERGENCY)
Facility: HOSPITAL | Age: 38
Discharge: HOME | End: 2023-06-02
Attending: EMERGENCY MEDICINE
Payer: MEDICAID

## 2023-06-02 VITALS
SYSTOLIC BLOOD PRESSURE: 125 MMHG | HEART RATE: 87 BPM | RESPIRATION RATE: 18 BRPM | OXYGEN SATURATION: 97 % | HEIGHT: 59 IN | TEMPERATURE: 97.9 F | BODY MASS INDEX: 46.16 KG/M2 | DIASTOLIC BLOOD PRESSURE: 81 MMHG | WEIGHT: 229 LBS

## 2023-06-02 DIAGNOSIS — T24.232A PARTIAL THICKNESS BURN OF LEFT LOWER LEG, INITIAL ENCOUNTER: Primary | ICD-10-CM

## 2023-06-02 PROCEDURE — 90714 TD VACC NO PRESV 7 YRS+ IM: CPT | Performed by: EMERGENCY MEDICINE

## 2023-06-02 PROCEDURE — 99284 EMERGENCY DEPT VISIT MOD MDM: CPT

## 2023-06-02 PROCEDURE — 6370000000 HC RX 637 (ALT 250 FOR IP): Performed by: EMERGENCY MEDICINE

## 2023-06-02 PROCEDURE — 90471 IMMUNIZATION ADMIN: CPT | Performed by: EMERGENCY MEDICINE

## 2023-06-02 PROCEDURE — 6360000002 HC RX W HCPCS: Performed by: EMERGENCY MEDICINE

## 2023-06-02 RX ORDER — BACITRACIN ZINC 500 [USP'U]/G
OINTMENT TOPICAL 3 TIMES DAILY
Status: DISCONTINUED | OUTPATIENT
Start: 2023-06-02 | End: 2023-06-02 | Stop reason: HOSPADM

## 2023-06-02 RX ADMIN — CLOSTRIDIUM TETANI TOXOID ANTIGEN (FORMALDEHYDE INACTIVATED) AND CORYNEBACTERIUM DIPHTHERIAE TOXOID ANTIGEN (FORMALDEHYDE INACTIVATED) 0.5 ML: 5; 2 INJECTION, SUSPENSION INTRAMUSCULAR at 10:58

## 2023-06-02 RX ADMIN — BACITRACIN ZINC: 500 OINTMENT TOPICAL at 10:58

## 2023-06-02 ASSESSMENT — ENCOUNTER SYMPTOMS: COLOR CHANGE: 1
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
## (undated) DEVICE — SUTURE CHROMIC GUT SZ 3-0 L27IN ABSRB BRN L17MM RB-1 1/2 U204H

## (undated) DEVICE — STERILE POLYISOPRENE POWDER-FREE SURGICAL GLOVES: Brand: PROTEXIS

## (undated) DEVICE — NEEDLE HYPO 25GA L1.5IN BLU POLYPR HUB S STL REG BVL STR

## (undated) DEVICE — KENDALL SCD EXPRESS SLEEVES, KNEE LENGTH, MEDIUM: Brand: KENDALL SCD

## (undated) DEVICE — SOLUTION IV 1000ML 0.9% SOD CHL

## (undated) DEVICE — REM POLYHESIVE ADULT PATIENT RETURN ELECTRODE: Brand: VALLEYLAB

## (undated) DEVICE — SPONGE GZ W4XL4IN COT 12 PLY TYP VII WVN C FLD DSGN

## (undated) DEVICE — SURGIFOAM SPNG SZ 12-7

## (undated) DEVICE — MEDI-VAC SUCTION HANDLE REGULAR CAPACITY: Brand: CARDINAL HEALTH

## (undated) DEVICE — FIRM 4CM: Brand: NASOPORE

## (undated) DEVICE — PACKING GZ W2INXL6FT WVN COT VAG RADPQ

## (undated) DEVICE — SYR 20ML LL STRL LF --

## (undated) DEVICE — SYR 10ML CTRL LR LCK NSAF LF --

## (undated) DEVICE — ARGYLE FRAZIER SURGICAL SUCTION INSTRUMENT 12 FR/CH (4.0 MM): Brand: ARGYLE

## (undated) DEVICE — DEPAUL MINOR HEAD AND NECK: Brand: MEDLINE INDUSTRIES, INC.

## (undated) DEVICE — Z DISCONTINUED USE 2425483 (LOW STOCK PER MEDLINE) TAPE UMB L18IN DIA1/8IN WHT COT NONABSORBABLE W/O NDL FOR

## (undated) DEVICE — INTENDED FOR TISSUE SEPARATION, AND OTHER PROCEDURES THAT REQUIRE A SHARP SURGICAL BLADE TO PUNCTURE OR CUT.: Brand: BARD-PARKER ® CARBON RIB-BACK BLADES